# Patient Record
Sex: MALE | URBAN - METROPOLITAN AREA
[De-identification: names, ages, dates, MRNs, and addresses within clinical notes are randomized per-mention and may not be internally consistent; named-entity substitution may affect disease eponyms.]

---

## 2021-05-29 ENCOUNTER — RECORDS - HEALTHEAST (OUTPATIENT)
Dept: ADMINISTRATIVE | Facility: CLINIC | Age: 46
End: 2021-05-29

## 2024-12-04 ENCOUNTER — HOSPITAL ENCOUNTER (EMERGENCY)
Facility: CLINIC | Age: 49
Discharge: HOME | End: 2024-12-04
Attending: EMERGENCY MEDICINE | Admitting: EMERGENCY MEDICINE
Payer: COMMERCIAL

## 2024-12-04 VITALS
DIASTOLIC BLOOD PRESSURE: 129 MMHG | OXYGEN SATURATION: 97 % | SYSTOLIC BLOOD PRESSURE: 185 MMHG | BODY MASS INDEX: 44.5 KG/M2 | RESPIRATION RATE: 17 BRPM | HEIGHT: 70 IN | WEIGHT: 310.85 LBS | HEART RATE: 78 BPM | TEMPERATURE: 97.4 F

## 2024-12-04 DIAGNOSIS — I49.3 PVC'S (PREMATURE VENTRICULAR CONTRACTIONS): ICD-10-CM

## 2024-12-04 LAB
ANION GAP SERPL CALCULATED.3IONS-SCNC: 16 MMOL/L (ref 7–15)
BASOPHILS # BLD AUTO: 0 10E3/UL (ref 0–0.2)
BASOPHILS NFR BLD AUTO: 0 %
BUN SERPL-MCNC: 14.6 MG/DL (ref 6–20)
CALCIUM SERPL-MCNC: 9.3 MG/DL (ref 8.8–10.4)
CHLORIDE SERPL-SCNC: 102 MMOL/L (ref 98–107)
CREAT SERPL-MCNC: 0.92 MG/DL (ref 0.67–1.17)
EGFRCR SERPLBLD CKD-EPI 2021: >90 ML/MIN/1.73M2
EOSINOPHIL # BLD AUTO: 0.1 10E3/UL (ref 0–0.7)
EOSINOPHIL NFR BLD AUTO: 1 %
ERYTHROCYTE [DISTWIDTH] IN BLOOD BY AUTOMATED COUNT: 11.7 % (ref 10–15)
GLUCOSE SERPL-MCNC: 95 MG/DL (ref 70–99)
HCO3 SERPL-SCNC: 20 MMOL/L (ref 22–29)
HCT VFR BLD AUTO: 44.7 % (ref 40–53)
HGB BLD-MCNC: 15.9 G/DL (ref 13.3–17.7)
HOLD SPECIMEN: NORMAL
HOLD SPECIMEN: NORMAL
IMM GRANULOCYTES # BLD: 0 10E3/UL
IMM GRANULOCYTES NFR BLD: 0 %
LYMPHOCYTES # BLD AUTO: 1.9 10E3/UL (ref 0.8–5.3)
LYMPHOCYTES NFR BLD AUTO: 31 %
MCH RBC QN AUTO: 30.5 PG (ref 26.5–33)
MCHC RBC AUTO-ENTMCNC: 35.6 G/DL (ref 31.5–36.5)
MCV RBC AUTO: 86 FL (ref 78–100)
MONOCYTES # BLD AUTO: 0.5 10E3/UL (ref 0–1.3)
MONOCYTES NFR BLD AUTO: 8 %
NEUTROPHILS # BLD AUTO: 3.7 10E3/UL (ref 1.6–8.3)
NEUTROPHILS NFR BLD AUTO: 60 %
NRBC # BLD AUTO: 0 10E3/UL
NRBC BLD AUTO-RTO: 0 /100
PLATELET # BLD AUTO: 211 10E3/UL (ref 150–450)
POTASSIUM SERPL-SCNC: 4 MMOL/L (ref 3.4–5.3)
RBC # BLD AUTO: 5.21 10E6/UL (ref 4.4–5.9)
SODIUM SERPL-SCNC: 138 MMOL/L (ref 135–145)
TROPONIN T SERPL HS-MCNC: <6 NG/L
WBC # BLD AUTO: 6.1 10E3/UL (ref 4–11)

## 2024-12-04 PROCEDURE — 84484 ASSAY OF TROPONIN QUANT: CPT | Performed by: EMERGENCY MEDICINE

## 2024-12-04 PROCEDURE — 99284 EMERGENCY DEPT VISIT MOD MDM: CPT

## 2024-12-04 PROCEDURE — 80048 BASIC METABOLIC PNL TOTAL CA: CPT | Performed by: EMERGENCY MEDICINE

## 2024-12-04 PROCEDURE — 36415 COLL VENOUS BLD VENIPUNCTURE: CPT | Performed by: EMERGENCY MEDICINE

## 2024-12-04 PROCEDURE — 85025 COMPLETE CBC W/AUTO DIFF WBC: CPT | Performed by: EMERGENCY MEDICINE

## 2024-12-04 PROCEDURE — 85014 HEMATOCRIT: CPT | Performed by: EMERGENCY MEDICINE

## 2024-12-04 PROCEDURE — 82565 ASSAY OF CREATININE: CPT | Performed by: EMERGENCY MEDICINE

## 2024-12-04 PROCEDURE — 93005 ELECTROCARDIOGRAM TRACING: CPT

## 2024-12-04 PROCEDURE — 85004 AUTOMATED DIFF WBC COUNT: CPT | Performed by: EMERGENCY MEDICINE

## 2024-12-04 ASSESSMENT — COLUMBIA-SUICIDE SEVERITY RATING SCALE - C-SSRS
6. HAVE YOU EVER DONE ANYTHING, STARTED TO DO ANYTHING, OR PREPARED TO DO ANYTHING TO END YOUR LIFE?: NO
2. HAVE YOU ACTUALLY HAD ANY THOUGHTS OF KILLING YOURSELF IN THE PAST MONTH?: NO
1. IN THE PAST MONTH, HAVE YOU WISHED YOU WERE DEAD OR WISHED YOU COULD GO TO SLEEP AND NOT WAKE UP?: NO

## 2024-12-04 ASSESSMENT — ACTIVITIES OF DAILY LIVING (ADL): ADLS_ACUITY_SCORE: 41

## 2024-12-04 NOTE — ED TRIAGE NOTES
Pt complains of lightheadedness, palpitations, difficulty focusing which started Sunday and has been intermittent since until this AM which has been constant since 0900 this AM. No cardiac hx. Pt endorses some sob. Pt endorses some difficulty w/ grasping items w/ left hand which also started Sunday. No thinners.

## 2024-12-04 NOTE — ED PROVIDER NOTES
"  Emergency Department Note      History of Present Illness     Chief Complaint   Palpitations    HPI   Tu Almanza is a 49 year old male with a history of hyperlipidemia who presents to the ER for palpitations. Patient reports irregular heart rate with some nausea and sweating 4 days ago that has been intermittent since. He endorses similar symptoms when taking a stress test in August, feeling them all day today, lightheadedness, alcohol use, occasional smoking, and taking atorvastatin and vitamins daily. Tu denies cough, cold, rhinorrhea, or history of heart issues.     Independent Historian   None    Review of External Notes   I reviewed his telemedicine visit with pulmonology on June 21 of this year in which they outlined severe obstructive sleep apnea and the need for BiPAP which she was fitted for.    Past Medical History     Medical History and Problem List   Celiac disease   Hyperlipidemia   Rosacea   BHARGAVI     Medications   Atorvastatin   Cyclobenzaprine     Surgical History   Hernia repair   Physical Exam     Patient Vitals for the past 24 hrs:   BP Temp Temp src Pulse Resp SpO2 Height Weight   12/04/24 1419 (!) 185/129 97.4  F (36.3  C) Temporal 78 17 97 % 1.778 m (5' 10\") 141 kg (310 lb 13.6 oz)     Physical Exam  Constitutional: Vital signs reviewed as above  General: Alert  HEENT: Moist mucous membranes  Eyes: Conjunctiva normal.   Neck: Normal range of motion  Cardiovascular: Regular rate, Regular rhythm and normal heart sounds.  No MRG  Pulmonary/Chest: Effort normal and breath sounds normal. No respiratory distress. Patient has no wheezes. Patient has no rales.   Abdominal: Soft. Positive bowel sounds. No MRG.  Musculoskeletal/Extremities: Full ROM.  Endo: No pitting edema  Neurological: Alert, no focal deficits.  Skin: Skin is warm and dry.   Psychiatric: Pleasant   Diagnostics     Lab Results   Labs Ordered and Resulted from Time of ED Arrival to Time of ED Departure   BASIC METABOLIC PANEL - " Abnormal       Result Value    Sodium 138      Potassium 4.0      Chloride 102      Carbon Dioxide (CO2) 20 (*)     Anion Gap 16 (*)     Urea Nitrogen 14.6      Creatinine 0.92      GFR Estimate >90      Calcium 9.3      Glucose 95     TROPONIN T, HIGH SENSITIVITY - Normal    Troponin T, High Sensitivity <6     CBC WITH PLATELETS AND DIFFERENTIAL    WBC Count 6.1      RBC Count 5.21      Hemoglobin 15.9      Hematocrit 44.7      MCV 86      MCH 30.5      MCHC 35.6      RDW 11.7      Platelet Count 211      % Neutrophils 60      % Lymphocytes 31      % Monocytes 8      % Eosinophils 1      % Basophils 0      % Immature Granulocytes 0      NRBCs per 100 WBC 0      Absolute Neutrophils 3.7      Absolute Lymphocytes 1.9      Absolute Monocytes 0.5      Absolute Eosinophils 0.1      Absolute Basophils 0.0      Absolute Immature Granulocytes 0.0      Absolute NRBCs 0.0         Imaging   No orders to display       EKG   ECG results from 12/04/24   EKG 12 lead     Value    Systolic Blood Pressure     Diastolic Blood Pressure     Ventricular Rate 72    Atrial Rate 72    MA Interval 130    QRS Duration 88        QTc 420    P Axis 28    R AXIS 36    T Axis 21    Interpretation ECG      Sinus rhythm with occasional Premature ventricular complexes  Otherwise normal ECG  No previous ECGs available  I reviewed this ECG at 1610.        Independent Interpretation   EKG shows sinus rhythm with PVCs, rate 72, no ischemic changes    ED Course      Medications Administered   Medications - No data to display    Discussion of Management   None    ED Course   ED Course as of 12/04/24 1843   Wed Dec 04, 2024   1605 I obtained the history and examined the patient as noted above.        Additional Documentation  None    Medical Decision Making / Diagnosis     CMS Diagnoses: None    MIPS       None    MDM   Tu Almanza is a 49 year old male who presents emerged part with concerns over frequent palpitations.  He has noticed some more as  of late generally later in the evening when he is laying down.  He says today at work they were coming so frequently he could not concentrate.  There is no chest pain associated with this.  No nausea or vomiting.  He did have a stress test back in May which was within normal limits.  EKG here again is nondiagnostic but does show PVC..  Troponin was undetectable making acute course and very unlikely.  CBC and BMP were unremarkable.  I suspect that the cause of his symptoms of the PVCs.  He may have increased PVCs during certain times of the day which are noticeable and unsettling.  We did discuss that this is not dangerous but can be difficult to tolerate.  I have advised he follow-up with his primary care doctor to determine if a beta-blocker would be a good choice for him.  His blood pressure was notably high here today as well.  That is also something he can discuss with his PCP.    Disposition   The patient was discharged.     Diagnosis     ICD-10-CM    1. PVC's (premature ventricular contractions)  I49.3            Discharge Medications   There are no discharge medications for this patient.        Scribe Disclosure:  I, Lanre Jean, am serving as a scribe at 4:11 PM on 12/4/2024 to document services personally performed by Narciso Delatorre MD based on my observations and the provider's statements to me.        Narciso Delatorre MD  12/04/24 [id number]

## 2024-12-04 NOTE — ED NOTES
Pt discharged home by writer. Pt looks well. BP improved from previous. Pt given AVS and instructions and agreeable to plan. Left amb with good gait to exit alongside wife.

## 2024-12-05 LAB
ATRIAL RATE - MUSE: 72 BPM
DIASTOLIC BLOOD PRESSURE - MUSE: NORMAL MMHG
INTERPRETATION ECG - MUSE: NORMAL
P AXIS - MUSE: 28 DEGREES
PR INTERVAL - MUSE: 130 MS
QRS DURATION - MUSE: 88 MS
QT - MUSE: 384 MS
QTC - MUSE: 420 MS
R AXIS - MUSE: 36 DEGREES
SYSTOLIC BLOOD PRESSURE - MUSE: NORMAL MMHG
T AXIS - MUSE: 21 DEGREES
VENTRICULAR RATE- MUSE: 72 BPM

## 2025-06-29 ENCOUNTER — HEALTH MAINTENANCE LETTER (OUTPATIENT)
Age: 50
End: 2025-06-29